# Patient Record
Sex: MALE | Race: WHITE | NOT HISPANIC OR LATINO | URBAN - METROPOLITAN AREA
[De-identification: names, ages, dates, MRNs, and addresses within clinical notes are randomized per-mention and may not be internally consistent; named-entity substitution may affect disease eponyms.]

---

## 2017-09-09 ENCOUNTER — EMERGENCY (EMERGENCY)
Facility: HOSPITAL | Age: 32
LOS: 1 days | Discharge: HOME | End: 2017-09-09
Admitting: EMERGENCY MEDICINE
Payer: MEDICAID

## 2017-09-09 VITALS
TEMPERATURE: 98 F | OXYGEN SATURATION: 100 % | RESPIRATION RATE: 17 BRPM | DIASTOLIC BLOOD PRESSURE: 96 MMHG | HEART RATE: 78 BPM | SYSTOLIC BLOOD PRESSURE: 150 MMHG

## 2017-09-09 DIAGNOSIS — S01.511A LACERATION WITHOUT FOREIGN BODY OF LIP, INITIAL ENCOUNTER: ICD-10-CM

## 2017-09-09 DIAGNOSIS — E78.5 HYPERLIPIDEMIA, UNSPECIFIED: ICD-10-CM

## 2017-09-09 DIAGNOSIS — S00.511A ABRASION OF LIP, INITIAL ENCOUNTER: ICD-10-CM

## 2017-09-09 PROCEDURE — 12011 RPR F/E/E/N/L/M 2.5 CM/<: CPT

## 2017-09-09 PROCEDURE — 99283 EMERGENCY DEPT VISIT LOW MDM: CPT | Mod: 25

## 2017-09-09 NOTE — ED PROVIDER NOTE - MEDICAL DECISION MAKING DETAILS
pt with persistent bleeding from philtrum region, unknown etiology, not on any AC, no associated pain or change in behavior, likely from friction/rubbing, bleeding controlled with local injection of lido w epi and dermabond w steristrips, wound care instructions provided, medically stable for d/c

## 2017-09-09 NOTE — ED PROVIDER NOTE - OBJECTIVE STATEMENT
31 yo M with PMHx of moderate MR, seizure d/o, autism, HLD, lyme dz, sent from day program for evaluation of bleeding from philtrum region x 1 hr.  Per counselor, pt was at the day program and noted spontaneous bleeding from the philtrum region, unable to stop with direct pressure and sent in for further evaluation 31 yo M with PMHx of moderate MR, seizure d/o, autism, HLD, lyme dz, sent from day program for evaluation of bleeding from philtrum region x 1 hr.  Per counselor, pt was at the day program and noted spontaneous bleeding from the philtrum region, unable to stop with direct pressure and sent in for further evaluation.  No apparent trauma, fall, pain, SOB, CP, epistaxis, HA, dizziness, change in behavior, N/V, or focal weakness noted.  Last tetanus within 5 yrs.  Per counselor, pt does rub his nose often and cause skin avulsion from time to time. Not on any AC/NSAID

## 2017-09-09 NOTE — ED PROVIDER NOTE - PHYSICAL EXAMINATION
Gen - NAD, comfortable in stretcher,  non-toxic appearing  Skin - warm, dry, mild skin avulsion to the philtrum region noted with active bleeding, no FB, bony or cartilage exposure noted   CV - S1S2, R/R/R  Resp - CTAB, no r/r/w   MS - w/w/p, no c/c/e  Neuro - AxOx3, interacting appropriately

## 2018-02-08 ENCOUNTER — EMERGENCY (EMERGENCY)
Facility: HOSPITAL | Age: 33
LOS: 1 days | Discharge: HOME | End: 2018-02-08
Admitting: EMERGENCY MEDICINE
Payer: MEDICAID

## 2018-02-08 VITALS
TEMPERATURE: 99 F | SYSTOLIC BLOOD PRESSURE: 121 MMHG | RESPIRATION RATE: 18 BRPM | OXYGEN SATURATION: 99 % | DIASTOLIC BLOOD PRESSURE: 70 MMHG | HEART RATE: 101 BPM

## 2018-02-08 VITALS
RESPIRATION RATE: 18 BRPM | HEART RATE: 101 BPM | OXYGEN SATURATION: 100 % | TEMPERATURE: 98 F | DIASTOLIC BLOOD PRESSURE: 69 MMHG | SYSTOLIC BLOOD PRESSURE: 107 MMHG

## 2018-02-08 DIAGNOSIS — R05 COUGH: ICD-10-CM

## 2018-02-08 DIAGNOSIS — J11.00 INFLUENZA DUE TO UNIDENTIFIED INFLUENZA VIRUS WITH UNSPECIFIED TYPE OF PNEUMONIA: ICD-10-CM

## 2018-02-08 LAB
FLUAV SPEC QL CULT: NEGATIVE — SIGNIFICANT CHANGE UP
FLUBV AG SPEC QL IA: POSITIVE

## 2018-02-08 PROCEDURE — 99284 EMERGENCY DEPT VISIT MOD MDM: CPT

## 2018-02-08 PROCEDURE — 71046 X-RAY EXAM CHEST 2 VIEWS: CPT | Mod: 26

## 2018-02-08 RX ORDER — IBUPROFEN 200 MG
1 TABLET ORAL
Qty: 28 | Refills: 0 | OUTPATIENT
Start: 2018-02-08 | End: 2018-02-14

## 2018-02-08 RX ORDER — AZITHROMYCIN 500 MG/1
500 TABLET, FILM COATED ORAL ONCE
Qty: 0 | Refills: 0 | Status: COMPLETED | OUTPATIENT
Start: 2018-02-08 | End: 2018-02-08

## 2018-02-08 RX ORDER — CLARITHROMYCIN 500 MG
1 TABLET ORAL
Qty: 4 | Refills: 0 | OUTPATIENT
Start: 2018-02-08 | End: 2018-02-11

## 2018-02-08 RX ADMIN — Medication 75 MILLIGRAM(S): at 13:27

## 2018-02-08 RX ADMIN — AZITHROMYCIN 500 MILLIGRAM(S): 500 TABLET, FILM COATED ORAL at 13:27

## 2018-02-08 NOTE — ED PROVIDER NOTE - MEDICAL DECISION MAKING DETAILS
well appearing male from [facility], hx MR/autism, here with cough/cold symptoms with fever 99.6 at [facility], rapid flu +, will treat with tamiflu, cxr shows early RML infiltrate - will treat for CAP with z-laurie, supportive care, f/u PMD

## 2018-02-08 NOTE — ED ADULT NURSE NOTE - OBJECTIVE STATEMENT
Pt brought in by aide from residence. As per aide pt has been coughing for past 2 days, wanted to have him tested for flu. Aide denies any fevers or chills. Pt has hx of MR and autism, able to answer yes or no questions. Pt denies chest pain or SOB. Pt appears in no apparent distress, will continue to monitor

## 2018-02-08 NOTE — ED PROVIDER NOTE - OBJECTIVE STATEMENT
32 y/o M with h/o severe MR, h/o autism, h/o seizure disorder, is here with a home aid, the home aid states that he's been coughing for the past 2 days and reports a temp of 99.6 at group home. He's tolerating PO at the group home. Pt denies abd pain, vomiting, or diarrhea. 32 y/o M with h/o severe MR, h/o autism, h/o seizure disorder, is here with staff, the aid states that he's been coughing for the past 2 days and reports a temp of 99.6 at group home. Eating/drinking well at the group home. Pt denies abd pain, vomiting, or diarrhea.

## 2019-06-06 ENCOUNTER — EMERGENCY (EMERGENCY)
Facility: HOSPITAL | Age: 34
LOS: 1 days | Discharge: HOME | End: 2019-06-06
Attending: EMERGENCY MEDICINE | Admitting: EMERGENCY MEDICINE
Payer: MEDICAID

## 2019-06-06 VITALS
WEIGHT: 169.98 LBS | OXYGEN SATURATION: 96 % | RESPIRATION RATE: 18 BRPM | SYSTOLIC BLOOD PRESSURE: 114 MMHG | HEART RATE: 75 BPM | TEMPERATURE: 99 F | DIASTOLIC BLOOD PRESSURE: 80 MMHG

## 2019-06-06 PROCEDURE — 99283 EMERGENCY DEPT VISIT LOW MDM: CPT

## 2019-06-06 RX ORDER — CETIRIZINE HYDROCHLORIDE 10 MG/1
1 TABLET ORAL
Qty: 10 | Refills: 0
Start: 2019-06-06 | End: 2019-06-15

## 2019-06-06 RX ORDER — SODIUM CHLORIDE 0.65 %
1 AEROSOL, SPRAY (ML) NASAL
Qty: 1 | Refills: 0
Start: 2019-06-06 | End: 2019-07-05

## 2019-06-06 NOTE — ED PROVIDER NOTE - ENMT, MLM
Airway patent, Nasal mucosa clear. Mouth with normal mucosa. Throat has no vesicles, no oropharyngeal exudates and uvula is midline. no active bleeding

## 2019-06-06 NOTE — ED PROVIDER NOTE - OBJECTIVE STATEMENT
34 y.o. male with  hx autism lyme HTN HLD seizures severe dev delay from group home with aide who states he has cough sneezing and rhinorrhea, pt is a poor historian and the aide in minimally contributive to hx/HPI.  Denies fever/chills, non productive cough, no vomiting, normal appetite, normal behavior.

## 2019-06-06 NOTE — ED PROVIDER NOTE - CLINICAL SUMMARY MEDICAL DECISION MAKING FREE TEXT BOX
34 y.o. male with  hx autism lyme HTN HLD seizures severe dev delay with cough nasal congestion rhinorrhea from viral URI, also with hx seasonal allergies could be exacerbation of that.  Lungs clear no wheezing no dyspnea no SOB.  supportive care, cough med, allergy med, nasal saline, stable for dc home outpatient followup.

## 2019-06-06 NOTE — ED ADULT TRIAGE NOTE - CHIEF COMPLAINT QUOTE
presents to ED with caregiver with cough, some dried blood around nose, nasal congestion since today.

## 2019-06-10 DIAGNOSIS — R05 COUGH: ICD-10-CM

## 2019-06-10 DIAGNOSIS — J06.9 ACUTE UPPER RESPIRATORY INFECTION, UNSPECIFIED: ICD-10-CM

## 2019-06-10 DIAGNOSIS — Z79.899 OTHER LONG TERM (CURRENT) DRUG THERAPY: ICD-10-CM

## 2019-06-10 DIAGNOSIS — I10 ESSENTIAL (PRIMARY) HYPERTENSION: ICD-10-CM

## 2019-06-10 DIAGNOSIS — J30.2 OTHER SEASONAL ALLERGIC RHINITIS: ICD-10-CM

## 2019-06-10 DIAGNOSIS — E78.5 HYPERLIPIDEMIA, UNSPECIFIED: ICD-10-CM

## 2019-06-12 RX ORDER — GEMFIBROZIL 600 MG
1 TABLET ORAL
Qty: 0 | Refills: 0 | DISCHARGE

## 2019-06-12 RX ORDER — ASPIRIN/CALCIUM CARB/MAGNESIUM 324 MG
1 TABLET ORAL
Qty: 0 | Refills: 0 | DISCHARGE

## 2019-06-12 RX ORDER — LISINOPRIL 2.5 MG/1
1 TABLET ORAL
Qty: 0 | Refills: 0 | DISCHARGE

## 2019-06-12 RX ORDER — LACOSAMIDE 50 MG/1
1 TABLET ORAL
Qty: 0 | Refills: 0 | DISCHARGE

## 2019-06-12 RX ORDER — CITALOPRAM 10 MG/1
1 TABLET, FILM COATED ORAL
Qty: 0 | Refills: 0 | DISCHARGE

## 2019-06-12 RX ORDER — FLUTICASONE PROPIONATE 220 MCG
1 AEROSOL WITH ADAPTER (GRAM) INHALATION
Qty: 0 | Refills: 0 | DISCHARGE

## 2019-06-13 PROBLEM — F84.0 AUTISTIC DISORDER: Chronic | Status: ACTIVE | Noted: 2018-02-08

## 2019-06-13 PROBLEM — F72 SEVERE INTELLECTUAL DISABILITIES: Chronic | Status: ACTIVE | Noted: 2018-02-08

## 2019-06-13 PROBLEM — G40.909 EPILEPSY, UNSPECIFIED, NOT INTRACTABLE, WITHOUT STATUS EPILEPTICUS: Chronic | Status: ACTIVE | Noted: 2018-02-08

## 2020-01-25 ENCOUNTER — EMERGENCY (EMERGENCY)
Facility: HOSPITAL | Age: 35
LOS: 1 days | Discharge: HOME | End: 2020-01-25
Admitting: EMERGENCY MEDICINE
Payer: MEDICAID

## 2020-01-25 VITALS
TEMPERATURE: 98 F | HEIGHT: 67 IN | DIASTOLIC BLOOD PRESSURE: 76 MMHG | RESPIRATION RATE: 17 BRPM | HEART RATE: 68 BPM | SYSTOLIC BLOOD PRESSURE: 111 MMHG | WEIGHT: 149.91 LBS | OXYGEN SATURATION: 98 %

## 2020-01-25 PROCEDURE — 99283 EMERGENCY DEPT VISIT LOW MDM: CPT

## 2020-01-25 RX ADMIN — Medication 0.5 MILLIGRAM(S): at 22:29

## 2020-01-25 NOTE — ED ADULT NURSE NOTE - CHIEF COMPLAINT QUOTE
Pt presents from [facility] for medication refill. As per pt paperwork pt has run out of Ativan prescription, h/o seizures,Autism, high cholesterol, lyme disease and moderate MR. Pt calm and cooperative in triage.

## 2020-01-25 NOTE — ED PROVIDER NOTE - PATIENT PORTAL LINK FT
You can access the FollowMyHealth Patient Portal offered by [facility] by registering at the following website: [email or web address]. By joining [employer]’s FollowMyHealth portal, you will also be able to view your health information using other applications (apps) compatible with our system.

## 2020-01-25 NOTE — ED ADULT TRIAGE NOTE - CHIEF COMPLAINT QUOTE
Pt presents from [facility] for medication refill. As per pt paperwork pt has run out of Ativan prescription, h/o seizures,Autism, high cholesterol, lyme disease and moderate MR. Pt calm and cooperative in triage.

## 2020-01-25 NOTE — ED ADULT NURSE NOTE - PMH
Autism    Autism    HLD (hyperlipidemia)    Lyme disease    Mental retardation    MR (mental retardation), severe    Seizure disorder    Seizure disorder

## 2020-01-25 NOTE — ED PROVIDER NOTE - NSFOLLOWUPINSTRUCTIONS_ED_ALL_ED_FT
Please follow up with your doctor for refill prescriptions of your medications  You were given a dose of ativan 0.5mg in the ED tonight    RETURN TO THE EMERGENCY DEPARTMENT FOR ANY CONCERNS.

## 2020-01-25 NOTE — ED PROVIDER NOTE - PHYSICAL EXAMINATION
CONSTITUTIONAL: Well-developed; well-nourished; in no acute distress. tolerating PO  	SKIN: Skin is warm and dry, no acute rash.  	HEAD: Normocephalic; atraumatic.  	EYES: clear bilaterally   	ENT:  airway patent  	NECK: Supple; non tender.  	CARD: S1, S2 normal; no murmurs, gallops, or rubs. Regular rate and rhythm.  	RESP: No wheezes, rales or rhonchi.  	ABD: soft; non-distended; non-tender  	EXT: Normal ROM. ambulatory   	NEURO: at baseline  PSYCH: Cooperative, appropriate.

## 2020-01-25 NOTE — ED PROVIDER NOTE - CLINICAL SUMMARY MEDICAL DECISION MAKING FREE TEXT BOX
patient here with staff for dose of ativan as he is awaiting for medication delivery at facility, well appearing, no complaints. tolerating PO. will give dose here, f/u PMD

## 2020-01-25 NOTE — ED PROVIDER NOTE - OBJECTIVE STATEMENT
35 yo male here from [facility] with staff for dose of ativan as they are awaiting delivery of his medications from pharmacy, patient is due for dose tonight. h/o seizures ,Autism, high cholesterol, lyme disease and moderate MR. patient offers no medical complaints.

## 2020-01-26 ENCOUNTER — EMERGENCY (EMERGENCY)
Facility: HOSPITAL | Age: 35
LOS: 1 days | Discharge: HOME | End: 2020-01-26
Admitting: EMERGENCY MEDICINE
Payer: MEDICAID

## 2020-01-26 VITALS
RESPIRATION RATE: 16 BRPM | HEART RATE: 76 BPM | HEIGHT: 67 IN | OXYGEN SATURATION: 99 % | SYSTOLIC BLOOD PRESSURE: 117 MMHG | DIASTOLIC BLOOD PRESSURE: 76 MMHG | TEMPERATURE: 98 F

## 2020-01-26 PROBLEM — E78.5 HYPERLIPIDEMIA, UNSPECIFIED: Chronic | Status: ACTIVE | Noted: 2017-09-09

## 2020-01-26 PROBLEM — G40.909 EPILEPSY, UNSPECIFIED, NOT INTRACTABLE, WITHOUT STATUS EPILEPTICUS: Chronic | Status: ACTIVE | Noted: 2017-09-09

## 2020-01-26 PROBLEM — A69.20 LYME DISEASE, UNSPECIFIED: Chronic | Status: ACTIVE | Noted: 2017-09-09

## 2020-01-26 PROBLEM — F79 UNSPECIFIED INTELLECTUAL DISABILITIES: Chronic | Status: ACTIVE | Noted: 2017-09-09

## 2020-01-26 PROBLEM — F84.0 AUTISTIC DISORDER: Chronic | Status: ACTIVE | Noted: 2017-09-09

## 2020-01-26 PROCEDURE — 99283 EMERGENCY DEPT VISIT LOW MDM: CPT

## 2020-01-26 NOTE — ED PROVIDER NOTE - OBJECTIVE STATEMENT
33 y/o male with PMHx of autism, HLD, Lyme Disease, mental retardation, and seizure disorder, from [facility], accompanied by staff workers, presents to the ED requesting Ativan 0.5mg dose. As per staff workers, Pt ran out medications and is awaiting refills - unsure when the medication will be delivered to him. No other medical complaints at this time. Denies fever or chills.

## 2020-01-26 NOTE — ED PROVIDER NOTE - NSFOLLOWUPINSTRUCTIONS_ED_ALL_ED_FT
WE ARE CURRENTLY OUT OF ATIVAN 0.5MG PILLS. PLEASE BRING THE PATIENT TO ANOTHER FACILITY IN ORDER TO HAVE HIS DOSE OF MEDICATION ADMINISTERED.    PLEASE RETURN TO THE ER IMMEDIATELY IF THE PATIENT DEVELOPS ANY ACUTE MEDICAL COMPLAINTS OR ISSUES, OR IF YOU HAVE ANY ADDITIONAL CONCERNS.

## 2020-01-26 NOTE — ED PROVIDER NOTE - PATIENT PORTAL LINK FT
You can access the FollowMyHealth Patient Portal offered by [facility] by registering at the following website: [email or web address]. By joining [employer]’s FollowMyHealth portal, you will also be able to view your health information using other applications (apps) compatible with our system.

## 2020-01-26 NOTE — ED PROVIDER NOTE - CLINICAL SUMMARY MEDICAL DECISION MAKING FREE TEXT BOX
Ativan 0.5mg PO is out of stock. The patient is sitting comfortably in no acute distress with staff by his side, and offers no medical complaints at this time. This was discussed with [facility]'s nurse Xochitl Martinez ([phone]) who will have the patient brought to another site for his dose of Ativan 05mg PO. Strict return precautions reviewed.

## 2020-01-26 NOTE — ED ADULT NURSE NOTE - CHPI ED NUR SYMPTOMS NEG
no dizziness/no chills/no fever/no nausea/no pain/no weakness/no decreased eating/drinking/no tingling/no vomiting

## 2020-01-26 NOTE — ED ADULT TRIAGE NOTE - CHIEF COMPLAINT QUOTE
Pt from [facility], presents for dose of Ativan 0.5 Pt from [facility], presents for dose of Ativan 0.5mg, pt ran out medication and is awaiting refills

## 2020-01-30 DIAGNOSIS — Z79.82 LONG TERM (CURRENT) USE OF ASPIRIN: ICD-10-CM

## 2020-01-30 DIAGNOSIS — E78.5 HYPERLIPIDEMIA, UNSPECIFIED: ICD-10-CM

## 2020-01-30 DIAGNOSIS — F84.0 AUTISTIC DISORDER: ICD-10-CM

## 2020-01-30 DIAGNOSIS — Z79.899 OTHER LONG TERM (CURRENT) DRUG THERAPY: ICD-10-CM

## 2020-01-30 DIAGNOSIS — Z79.1 LONG TERM (CURRENT) USE OF NON-STEROIDAL ANTI-INFLAMMATORIES (NSAID): ICD-10-CM

## 2020-01-31 DIAGNOSIS — E78.5 HYPERLIPIDEMIA, UNSPECIFIED: ICD-10-CM

## 2020-01-31 DIAGNOSIS — Z79.899 OTHER LONG TERM (CURRENT) DRUG THERAPY: ICD-10-CM

## 2020-01-31 DIAGNOSIS — Z76.0 ENCOUNTER FOR ISSUE OF REPEAT PRESCRIPTION: ICD-10-CM

## 2020-01-31 DIAGNOSIS — Z79.82 LONG TERM (CURRENT) USE OF ASPIRIN: ICD-10-CM

## 2020-01-31 DIAGNOSIS — Z79.1 LONG TERM (CURRENT) USE OF NON-STEROIDAL ANTI-INFLAMMATORIES (NSAID): ICD-10-CM

## 2020-01-31 DIAGNOSIS — F84.0 AUTISTIC DISORDER: ICD-10-CM

## 2020-07-28 NOTE — ED PROVIDER NOTE - EYES, MLM
----- Message from Silvia Agarwal sent at 7/28/2020  3:24 PM CDT -----  Regarding: clarfication  Contact: [facility], [place]  Placed call to pod, Type:  Pharmacy Calling to Clarify an RX    Name of Caller:  [place]  Pharmacy Name:    [id number] [place] [address] AT [facility] & [address] [address]  Phone: [phone] Fax: [phone]     Prescription Name:  Novalog  What do they need to clarify?:  directions and roberto authorization   Best Call Back Number:  [phone]    Case number [id number]          
Being addressed in previous encounter  
Clear bilaterally, pupils equal, round and reactive to light.

## 2021-05-04 NOTE — ED PROVIDER NOTE - NS ED MD EM SELECTION
PT: approached pt this morning, pt refusing therapy due to bowel movement issues. Will continue to follow.    [id number] Detailed

## 2021-08-09 ENCOUNTER — EMERGENCY (EMERGENCY)
Facility: HOSPITAL | Age: 36
LOS: 1 days | Discharge: HOME | End: 2021-08-09
Attending: EMERGENCY MEDICINE | Admitting: EMERGENCY MEDICINE
Payer: MEDICAID

## 2021-08-09 VITALS
SYSTOLIC BLOOD PRESSURE: 108 MMHG | OXYGEN SATURATION: 97 % | TEMPERATURE: 98 F | HEART RATE: 77 BPM | RESPIRATION RATE: 16 BRPM | DIASTOLIC BLOOD PRESSURE: 72 MMHG

## 2021-08-09 VITALS
HEIGHT: 67 IN | OXYGEN SATURATION: 98 % | DIASTOLIC BLOOD PRESSURE: 70 MMHG | HEART RATE: 67 BPM | TEMPERATURE: 98 F | WEIGHT: 160.06 LBS | SYSTOLIC BLOOD PRESSURE: 106 MMHG | RESPIRATION RATE: 18 BRPM

## 2021-08-09 DIAGNOSIS — E78.5 HYPERLIPIDEMIA, UNSPECIFIED: ICD-10-CM

## 2021-08-09 DIAGNOSIS — Z76.0 ENCOUNTER FOR ISSUE OF REPEAT PRESCRIPTION: ICD-10-CM

## 2021-08-09 DIAGNOSIS — F84.0 AUTISTIC DISORDER: ICD-10-CM

## 2021-08-09 DIAGNOSIS — G40.909 EPILEPSY, UNSPECIFIED, NOT INTRACTABLE, WITHOUT STATUS EPILEPTICUS: ICD-10-CM

## 2021-08-09 DIAGNOSIS — F79 UNSPECIFIED INTELLECTUAL DISABILITIES: ICD-10-CM

## 2021-08-09 DIAGNOSIS — F72 SEVERE INTELLECTUAL DISABILITIES: ICD-10-CM

## 2021-08-09 PROCEDURE — 99283 EMERGENCY DEPT VISIT LOW MDM: CPT

## 2021-08-09 RX ADMIN — Medication 0.5 MILLIGRAM(S): at 01:46

## 2021-08-09 NOTE — ED PROVIDER NOTE - PATIENT PORTAL LINK FT
You can access the FollowMyHealth Patient Portal offered by [facility] by registering at the following website: [email or web address]. By joining [employer]’s FollowMyHealth portal, you will also be able to view your health information using other applications (apps) compatible with our system.

## 2021-08-09 NOTE — ED PROVIDER NOTE - CLINICAL SUMMARY MEDICAL DECISION MAKING FREE TEXT BOX
given evening dose po lorazepam, will dc with one week worth, and group home will obtain full refill from pmd. clinically stable for dc.

## 2021-08-09 NOTE — ED ADULT TRIAGE NOTE - CHIEF COMPLAINT QUOTE
Pt presents to ED with caretaker from [facility] home for med refill. As per care taker, 2mg Lorazepam ran out today and refill was not delivered.

## 2021-08-09 NOTE — ED PROVIDER NOTE - OBJECTIVE STATEMENT
37 yo M, hx of autism, severe MR, minimally verbal, seizure disorder, resident of group [place], with standing order for lorazepam 0.5 mg bid. per group home the pmd did not renew the script and patient ran out not getting his evening dose. otherwise no acute medical complaints. history limited secondary to mr, history provided by group home attendant.

## 2022-08-09 ENCOUNTER — EMERGENCY (EMERGENCY)
Facility: HOSPITAL | Age: 37
LOS: 1 days | Discharge: HOME | End: 2022-08-09
Attending: EMERGENCY MEDICINE
Payer: MEDICAID

## 2022-08-09 VITALS
RESPIRATION RATE: 18 BRPM | WEIGHT: 158.73 LBS | TEMPERATURE: 97 F | HEIGHT: 60 IN | HEART RATE: 78 BPM | DIASTOLIC BLOOD PRESSURE: 72 MMHG | SYSTOLIC BLOOD PRESSURE: 111 MMHG | OXYGEN SATURATION: 96 %

## 2022-08-09 PROCEDURE — 99283 EMERGENCY DEPT VISIT LOW MDM: CPT

## 2022-08-09 PROCEDURE — 99281 EMR DPT VST MAYX REQ PHY/QHP: CPT

## 2022-08-09 RX ADMIN — Medication 0.5 MILLIGRAM(S): at 12:49

## 2022-08-09 NOTE — ED PROVIDER NOTE - PATIENT PORTAL LINK FT
You can access the FollowMyHealth Patient Portal offered by [facility] by registering at the following website: [email or web address]. By joining [employer]’s FollowMyHealth portal, you will also be able to view your health information using other applications (apps) compatible with our system.

## 2022-08-09 NOTE — ED PROVIDER NOTE - PHYSICAL EXAMINATION
Afebrile, hemodynamically stable, saturating well  NAD, well appearing, sitting comfortably in chair, no WOB, speaking full sentences  Head NCAT  EOMI grossly, anicteric  MMM  RRR, nml S1/S2, no m/r/g  Lungs CTAB, no w/r/r  Abd soft, NT, ND, nml BS, no rebound or guarding  Alert, CN's 3-12 grossly intact  MENDOZA spontaneously, no leg cyanosis or edema  Skin warm, well perfused, no rashes or hives

## 2022-08-09 NOTE — ED PROVIDER NOTE - OBJECTIVE STATEMENT
37yoM with h/o seizure disorder, autism, HLD, moderate intellectual disability, presents from [facility] with aide for rx refill for lorazepam .5mg BID which he last took last night, as they state they called his clinic but will not be able to get an rx over for the next few days. They state he is behaving at his usual baseline and is calm and controlled, nml PO, UOP, behavior level, and no other concerns.

## 2022-08-09 NOTE — ED PROVIDER NOTE - CLINICAL SUMMARY MEDICAL DECISION MAKING FREE TEXT BOX
I attempted to reach pt's doctor but number listed goes through to voicemail. I d/w caretaker here and supervisor at [facility], and understand unable to prescribe this at this time and needs to see his own provider, but given his morning dose. NAD, well appearing, calm here in ED.

## 2022-08-09 NOTE — ED PROVIDER NOTE - NSFOLLOWUPINSTRUCTIONS_ED_ALL_ED_FT
Bj was given 0.5mg lorazepam.  Please follow up with your doctor.    Medicine Refill at the Emergency Department    It is best for you to get refills through your primary health care provider's office. In the future, please plan ahead so you do not need to get refills from the emergency department.    This information is not intended to replace advice given to you by your health care provider. Make sure you discuss any questions you have with your health care provider.

## 2022-08-26 ENCOUNTER — EMERGENCY (EMERGENCY)
Facility: HOSPITAL | Age: 37
LOS: 1 days | Discharge: HOME | End: 2022-08-26
Attending: EMERGENCY MEDICINE
Payer: MEDICAID

## 2022-08-26 VITALS
DIASTOLIC BLOOD PRESSURE: 77 MMHG | OXYGEN SATURATION: 96 % | RESPIRATION RATE: 16 BRPM | HEART RATE: 68 BPM | SYSTOLIC BLOOD PRESSURE: 109 MMHG | HEIGHT: 60 IN | TEMPERATURE: 98 F | WEIGHT: 155.65 LBS

## 2022-08-26 PROCEDURE — 99281 EMR DPT VST MAYX REQ PHY/QHP: CPT

## 2022-08-26 PROCEDURE — 99283 EMERGENCY DEPT VISIT LOW MDM: CPT

## 2022-08-26 NOTE — ED PROVIDER NOTE - NSFOLLOWUPINSTRUCTIONS_ED_ALL_ED_FT
Log Out.      [email or web address].fr CareNotes®     :  [facility]  	                     ANXIETY - AfterCare(R) Instructions(ER/ED)           Anxiety    WHAT YOU NEED TO KNOW:    Anxiety is a condition that causes you to feel extremely worried or nervous. The feelings are so strong that they can cause problems with your daily activities or sleep. Anxiety may be triggered by something you fear, or it may happen without a cause. Family or work stress, smoking, caffeine, and alcohol can increase your risk for anxiety. Certain medicines or health conditions can also increase your risk. Anxiety can become a long-term condition if it is not managed or treated.    DISCHARGE INSTRUCTIONS:    Call your local emergency number (911 in the [place]) if:   •You have chest pain, tightness, or heaviness that may spread to your shoulders, arms, jaw, neck, or back.      •You feel like hurting yourself or someone else.      Call your doctor if:   •Your symptoms get worse or do not get better with treatment.      •Your anxiety keeps you from doing your regular daily activities.      •You have new symptoms since your last visit.      •You have questions or concerns about your condition or care.      Medicines:   •Medicines may be given to help you feel more calm and relaxed, and decrease your symptoms.      •Take your medicine as directed. Contact your healthcare provider if you think your medicine is not helping or if you have side effects. Tell him or her if you are allergic to any medicine. Keep a list of the medicines, vitamins, and herbs you take. Include the amounts, and when and why you take them. Bring the list or the pill bottles to follow-up visits. Carry your medicine list with you in case of an emergency.      Manage anxiety:   •Talk to someone about your anxiety. Your healthcare provider may suggest counseling. Cognitive behavioral therapy can help you understand and change how you react to events that trigger your symptoms. You might feel more comfortable talking with a friend or family member about your anxiety. Choose someone you know will be supportive and encouraging.      •Find ways to relax. Activities such as exercise, meditation, or listening to music can help you relax. Spend time with friends, or do things you enjoy.      •Practice deep breathing. Deep breathing can help you relax when you feel anxious. Focus on taking slow, deep breaths several times a day, or during an anxiety attack. Breathe in through your nose and out through your mouth.      •Create a regular sleep routine. Regular sleep can help you feel calmer during the day. Go to sleep and wake up at the same times every day. Do not watch television or use the computer right before bed. Your room should be comfortable, dark, and quiet.      •Eat a variety of healthy foods. Healthy foods include fruits, vegetables, low-fat dairy products, lean meats, fish, whole-grain breads, and cooked beans. Healthy foods can help you feel less anxious and have more energy.  Healthy Foods           •Exercise regularly. Exercise can increase your energy level. Exercise may also lift your mood and help you sleep better. Your healthcare provider can help you create an exercise plan.   Family Walking for Exercise           •Do not smoke. Nicotine and other chemicals in cigarettes and cigars can increase anxiety. Ask your healthcare provider for information if you currently smoke and need help to quit. E-cigarettes or smokeless tobacco still contain nicotine. Talk to your healthcare provider before you use these products.      •Do not have caffeine. Caffeine can make your symptoms worse. Do not have foods or drinks that are meant to increase your energy level.      •Limit or do not drink alcohol. Ask your healthcare provider if alcohol is safe for you. You may not be able to drink alcohol if you take certain anxiety or depression medicines. Limit alcohol to 1 drink per day if you are a woman. Limit alcohol to 2 drinks per day if you are a man. A drink of alcohol is 12 ounces of beer, 5 ounces of wine, or 1½ ounces of liquor.      •Do not use drugs. Drugs can make your anxiety worse. It can also make anxiety hard to manage. Talk to your healthcare provider if you use drugs and want help to quit.      Follow up with your doctor within 2 weeks or as directed: Write down your questions so you remember to ask them during your visits.       © Copyright [employer] 2022           back to top                          © Copyright [employer] 2022

## 2022-08-26 NOTE — ED PROVIDER NOTE - PATIENT PORTAL LINK FT
You can access the FollowMyHealth Patient Portal offered by [facility] by registering at the following website: [email or web address]. By joining [employer]’s FollowMyHealth portal, you will also be able to view your health information using other applications (apps) compatible with our system.

## 2022-08-26 NOTE — ED PROVIDER NOTE - OBJECTIVE STATEMENT
here for medication refill for ativan hx of anxiety unable to see his pcp for the next few days  lives at adult home.

## 2022-08-26 NOTE — ED ADULT NURSE NOTE - OBJECTIVE STATEMENT
patient brought in from [facility]isted [facility] by staff requesting medication for ativan 0.5mg for hx of anxiety. Pt unable to provide much of info

## 2022-08-26 NOTE — ED PROVIDER NOTE - DURATION
I have personally performed a face to face diagnostic evaluation on this patient. I have reviewed the ACP note and agree with the history, exam and plan of care, except as noted.
month(s)

## 2022-12-01 ENCOUNTER — EMERGENCY (EMERGENCY)
Facility: HOSPITAL | Age: 37
LOS: 1 days | Discharge: HOME | End: 2022-12-01
Payer: MEDICAID

## 2022-12-01 VITALS
TEMPERATURE: 98 F | DIASTOLIC BLOOD PRESSURE: 82 MMHG | HEART RATE: 63 BPM | RESPIRATION RATE: 18 BRPM | WEIGHT: 169.98 LBS | SYSTOLIC BLOOD PRESSURE: 124 MMHG | OXYGEN SATURATION: 98 %

## 2022-12-01 PROCEDURE — 99281 EMR DPT VST MAYX REQ PHY/QHP: CPT

## 2022-12-01 PROCEDURE — 99283 EMERGENCY DEPT VISIT LOW MDM: CPT

## 2022-12-01 RX ORDER — LACOSAMIDE 50 MG/1
1 TABLET ORAL
Qty: 6 | Refills: 0
Start: 2022-12-01 | End: 2022-12-03

## 2022-12-01 NOTE — ED PROVIDER NOTE - PATIENT PORTAL LINK FT
You can access the FollowMyHealth Patient Portal offered by [facility] by registering at the following website: [email or web address]. By joining [employer]’s FollowMyHealth portal, you will also be able to view your health information using other applications (apps) compatible with our system.

## 2022-12-01 NOTE — ED PROVIDER NOTE - PHYSICAL EXAMINATION
GEN:   comfortable, in no apparent distress, AOx3  EYES:   PERRL, extra-occular movements intact  RESP:   clear to auscultation bilaterally, non-labored, speaking in full sentences  ABD:   soft, non tender, no guarding  MSK:   no musculoskeletal tenderness, 5/5 strength, moving all extremities  SKIN:   dry, intact, no rash  NEURO:   AOx3, no focal weakness or loss of sensation, gait normal, GCS 15  PSYCH: calm, cooperative, no evidence of hallucinations, paranoia, intoxication or withdrawal from any substances.

## 2022-12-01 NOTE — ED ADULT TRIAGE NOTE - CHIEF COMPLAINT QUOTE
pt brought in from group home as per aid " my patient is here for medication refill ' pt denies any seizure

## 2022-12-01 NOTE — ED PROVIDER NOTE - OBJECTIVE STATEMENT
37yoM with h/o seizure disorder, autism, HLD, moderate intellectual disability, presents from [facility] with grant [facility]n for rx refill for Vimpat. States patient takes it BID 100mg.  requesting 3 day refill as unable to get from outpatient provider.  States that patient has not skipped any doses.

## 2023-05-07 NOTE — ED PROVIDER NOTE - CPE EDP PSYCH NORM
normal... normal... awake, alert, oriented to person, place, time/situation and in no apparent distress.

## 2023-12-05 ENCOUNTER — EMERGENCY (EMERGENCY)
Facility: HOSPITAL | Age: 38
LOS: 1 days | Discharge: HOME | End: 2023-12-05
Admitting: EMERGENCY MEDICINE
Payer: MEDICAID

## 2023-12-05 VITALS
RESPIRATION RATE: 16 BRPM | TEMPERATURE: 98 F | OXYGEN SATURATION: 98 % | HEART RATE: 69 BPM | WEIGHT: 175.05 LBS | SYSTOLIC BLOOD PRESSURE: 122 MMHG | DIASTOLIC BLOOD PRESSURE: 80 MMHG | HEIGHT: 68 IN

## 2023-12-05 PROCEDURE — 99283 EMERGENCY DEPT VISIT LOW MDM: CPT

## 2023-12-05 NOTE — ED PROVIDER NOTE - PHYSICAL EXAMINATION
General: well developed, well nourished, no distress  Eyes: no scleral injection  Neck: non-tender, full range of motion, supple.   Respiratory: unlabored breathing  Cardiovascular: no central cyanosis  Musculoskeletal: normal gait.   Extremities: normal range of motion, non-tender.  Neurologic: alert, oriented to person, oriented to place, oriented to time.    Skin: normal color.  Negative For: rash  Psychiatric: normal affect, normal insight, normal concentration

## 2023-12-05 NOTE — ED PROVIDER NOTE - NSICDXPASTMEDICALHX_GEN_ALL_CORE_FT
PAST MEDICAL HISTORY:  Autism     Autism     HLD (hyperlipidemia)     Lyme disease     Mental retardation     MR (mental retardation), severe     Seizure disorder     Seizure disorder

## 2023-12-05 NOTE — ED PROVIDER NOTE - PATIENT PORTAL LINK FT
You can access the FollowMyHealth Patient Portal offered by [facility] by registering at the following website: [email or web address]. By joining [employer]’s FollowMyHealth portal, you will also be able to view your health information using other applications (apps) compatible with our system. You can access the FollowMyHealth Patient Portal offered by [facility] by registering at the following website: [email or web address]. By joining [employer]’s FollowMyHealth portal, you will also be able to view your health information using other applications (apps) compatible with our system.

## 2023-12-05 NOTE — ED PROVIDER NOTE - OBJECTIVE STATEMENT
38-year-old male, history of autism, presents this emergency room for 3-day supply of 0.5 mg of p.o. Ativan.  Patient presents with his aide, from a group home.  States that they are having issues with the doctor sending the prescription and it being sent out by the pharmacy.  States that the doctor prescribed it, however pharmacy never sent it.  States that they attempted to call the pharmacy, and states that it would be 3-5 business days.  That is in 2 days.  Requesting a 2-day supply.  Patient is not actively agitated currently.

## 2023-12-05 NOTE — ED PROVIDER NOTE - CLINICAL SUMMARY MEDICAL DECISION MAKING FREE TEXT BOX
Pt presents for Ativan refill  VSS  Explained to a that patient has an active prescription for Ativan, and we cannot give him a 3-day supply  Patient is also calm and cooperative, is not acutely agitated  Patient stable for discharge  All results reviewed with patient and patient's aide.  Understands agrees with plan.  Agreed to follow-up primary care doctor in 2 to 3 days.

## 2023-12-05 NOTE — ED ADULT TRIAGE NOTE - CHIEF COMPLAINT QUOTE
Pt walked in from adult home. Hx autism, minimally verbal. As per accompanying staff MD didn't refill standing order of Ativan 0.5 mg. No acute medical complaints

## 2023-12-05 NOTE — ED ADULT TRIAGE NOTE - AS TEMP SITE
Orders in place for Decadron 8mg IM. Patient states name, date of birth and allergies reviewed.  Decadron 4mg/ml x2 vials = 8mg/2ml administered IM to right deltoid muscle. NDC: 6369-9053-09 Lot: [id number] Exp: 11/30/2023. Patient tolerated without complaints. Instructed to remain in clinic for 15 minutes post injection. Verbalized understanding. No immediate reaction noted.      oral

## 2023-12-06 DIAGNOSIS — Z76.0 ENCOUNTER FOR ISSUE OF REPEAT PRESCRIPTION: ICD-10-CM

## 2023-12-06 DIAGNOSIS — F84.0 AUTISTIC DISORDER: ICD-10-CM

## 2024-01-06 ENCOUNTER — EMERGENCY (EMERGENCY)
Facility: HOSPITAL | Age: 39
LOS: 1 days | Discharge: HOME | End: 2024-01-06
Attending: EMERGENCY MEDICINE | Admitting: EMERGENCY MEDICINE
Payer: MEDICAID

## 2024-01-06 VITALS
RESPIRATION RATE: 19 BRPM | TEMPERATURE: 98 F | DIASTOLIC BLOOD PRESSURE: 82 MMHG | SYSTOLIC BLOOD PRESSURE: 123 MMHG | HEART RATE: 72 BPM | OXYGEN SATURATION: 96 %

## 2024-01-06 PROCEDURE — 99283 EMERGENCY DEPT VISIT LOW MDM: CPT

## 2024-01-06 RX ADMIN — Medication 0.5 MILLIGRAM(S): at 11:06

## 2024-01-06 NOTE — ED PROVIDER NOTE - PHYSICAL EXAMINATION
General: Patient is alert in NAD  Head: NC/AT;  Eyes: EOMI, no lid lag, no proptosis  Ears: Normal  Nose: Normal  Neck: Normal ROM  Lungs: Normal respiratory effort  Heart: Normal rate, no peripheral edema  Neuro: Nonfocal  Skin: No rash, lesions or ulcers  Psych: Normal affect and behavior, intact judgement and insight

## 2024-01-06 NOTE — ED PROVIDER NOTE - CLINICAL SUMMARY MEDICAL DECISION MAKING FREE TEXT BOX
Patient is a 38-year-old male who lives in a group home and the group home has run out of lorazepam tablets.  He is administered 0.5 mg lorazepam twice daily.  Last dose was yesterday.  The group home employee did bring the medication administration record for confirmation.  They present for a refill of the lorazepam and to get first dose now.  Will give one dose now of lorazepam and emergency 4 day prescription.

## 2024-01-06 NOTE — ED PROVIDER NOTE - NSFOLLOWUPINSTRUCTIONS_ED_ALL_ED_FT
Thank you for letting us take care of you today.  Return to the Emergency Department if your symptoms worsen, or if any problems.    You were provided an emergency 4 day prescription--sent to [facility].    Please make sure his regular prescribing physician does order his usual Lorazepam for his group home medications.    He should follow up with his primary care physician next week.

## 2024-01-06 NOTE — ED PROVIDER NOTE - PATIENT PORTAL LINK FT
You can access the FollowMyHealth Patient Portal offered by [facility] by registering at the following website: [email or web address]. By joining [employer]’s FollowMyHealth portal, you will also be able to view your health information using other applications (apps) compatible with our system. You can access the FollowMyHealth Patient Portal offered by [facility] by registering at the following website: [email or web address]. By joining [employer]’s FollowMyHealth portal, you will also be able to view your health information using other applications (apps) compatible with our system.

## 2024-01-06 NOTE — ED PROVIDER NOTE - OBJECTIVE STATEMENT
Patient is a 38-year-old male who lives in a group home and the group home has run out of lorazepam tablets.  He is administered 0.5 mg lorazepam twice daily.  Last dose was yesterday.  The group home employee did bring the medication administration record for confirmation.  They present for a refill of the lorazepam and to get first dose now.    PMH: Moderate mental retardation, seizure disorder, autism, high cholesterol, Lyme disease  Medications: Aspirin, fluoxetine, gemfibrozil, lisinopril, alprazolam, polyethylene glycol, Vimpat

## 2024-01-08 DIAGNOSIS — F84.0 AUTISTIC DISORDER: ICD-10-CM

## 2024-01-08 DIAGNOSIS — Z76.0 ENCOUNTER FOR ISSUE OF REPEAT PRESCRIPTION: ICD-10-CM

## 2024-01-08 DIAGNOSIS — G40.909 EPILEPSY, UNSPECIFIED, NOT INTRACTABLE, WITHOUT STATUS EPILEPTICUS: ICD-10-CM

## 2024-01-08 DIAGNOSIS — F71 MODERATE INTELLECTUAL DISABILITIES: ICD-10-CM

## 2024-01-08 DIAGNOSIS — E78.00 PURE HYPERCHOLESTEROLEMIA, UNSPECIFIED: ICD-10-CM

## 2024-06-08 ENCOUNTER — EMERGENCY (EMERGENCY)
Facility: HOSPITAL | Age: 39
LOS: 1 days | Discharge: HOME | End: 2024-06-08
Admitting: EMERGENCY MEDICINE
Payer: MEDICAID

## 2024-06-08 VITALS
OXYGEN SATURATION: 96 % | TEMPERATURE: 98 F | SYSTOLIC BLOOD PRESSURE: 129 MMHG | RESPIRATION RATE: 16 BRPM | DIASTOLIC BLOOD PRESSURE: 83 MMHG | HEART RATE: 73 BPM

## 2024-06-08 VITALS
SYSTOLIC BLOOD PRESSURE: 130 MMHG | TEMPERATURE: 98 F | HEART RATE: 65 BPM | RESPIRATION RATE: 18 BRPM | OXYGEN SATURATION: 99 % | DIASTOLIC BLOOD PRESSURE: 89 MMHG

## 2024-06-08 DIAGNOSIS — Z76.0 ENCOUNTER FOR ISSUE OF REPEAT PRESCRIPTION: ICD-10-CM

## 2024-06-08 DIAGNOSIS — R56.9 UNSPECIFIED CONVULSIONS: ICD-10-CM

## 2024-06-08 DIAGNOSIS — F84.0 AUTISTIC DISORDER: ICD-10-CM

## 2024-06-08 DIAGNOSIS — F79 UNSPECIFIED INTELLECTUAL DISABILITIES: ICD-10-CM

## 2024-06-08 PROBLEM — A69.20 LYME DISEASE, UNSPECIFIED: Chronic | Status: ACTIVE | Noted: 2024-01-06

## 2024-06-08 PROBLEM — F41.9 ANXIETY DISORDER, UNSPECIFIED: Chronic | Status: ACTIVE | Noted: 2022-08-26

## 2024-06-08 PROBLEM — G40.909 EPILEPSY, UNSPECIFIED, NOT INTRACTABLE, WITHOUT STATUS EPILEPTICUS: Chronic | Status: ACTIVE | Noted: 2024-01-06

## 2024-06-08 PROCEDURE — 99284 EMERGENCY DEPT VISIT MOD MDM: CPT

## 2024-06-08 NOTE — ED PROVIDER NOTE - NSFOLLOWUPINSTRUCTIONS_ED_ALL_ED_FT
The patient has already received his evening dose of Ativan in the ED tonight.  If the medications get delivered tonight do not give him an additional dose as he is already taken 2 doses of his Ativan today which is how it is prescribed.  Call the clinic on Monday morning to get the medication refilled as we do not generally refill ongoing controlled substance prescriptions in the ER.    PLEASE FOLLOW-UP WITH YOUR PRIMARY CARE DOCTOR IN 1-2 DAYS FOR FURTHER EVALUATION.      PLEASE TAKE ALL PAPERWORK FROM TODAY'S VISIT TO YOUR PRIMARY DOCTOR.     IF YOU DO NOT HAVE A PRIMARY CARE DOCTOR PLEASE REFER TO THE OFFICE/CLINIC INFORMATION GIVEN BELOW:    If you do not have a doctor, you can call our referral line to find a doctor that matches your insurance; the number is [phone].     You can also follow up with clinics listed below, if you do not have a doctor:  [place] [address]  To make an appointment, call [phone]    [facility]  Address: [address] [place] [address]  Appointment Center: [phone] ([phone])     PLEASE RETURN TO THE ER IMMEDIATELY OR CALL [phone] ANY HIGH FEVER, CHEST PAIN, TROUBLE BREATHING, VOMITING, SEVERE PAIN, OR ANY OTHER CONCERNS.

## 2024-06-08 NOTE — ED PROVIDER NOTE - PHYSICAL EXAMINATION
Constitutional: Well appearing, awake, alert, and in no apparent distress.  HEENT: Airway patent, NCAT  Resp: no conversational dyspnea, tachypnea, or signs of respiratory distress  Msk: ambulating with normal steady gait  Neuro: moving all extremities.

## 2024-06-08 NOTE — ED ADULT TRIAGE NOTE - CHIEF COMPLAINT QUOTE
pt in adult home d/t intellectual disability (nonverbal), per accompanying staff pt needs med refill for Lorazepam

## 2024-06-08 NOTE — ED PROVIDER NOTE - PATIENT PORTAL LINK FT
You can access the FollowMyHealth Patient Portal offered by [facility] by registering at the following website: [email or web address]. By joining [employer]’s FollowMyHealth portal, you will also be able to view your health information using other applications (apps) compatible with our system.

## 2024-06-08 NOTE — ED ADULT NURSE NOTE - OBJECTIVE STATEMENT
Pt presents to ED with caretaker for medication refill. Pt has PMH of autism spectrum disorder and is nonverbal at this time. Requesting prescription refill for lorazepam, as patient's primary care provider did not send prescription and they are unable to book another appointment until Monday. Last dose was this morning and pt is due for next dose tonight. No medical complaints at this time.

## 2024-06-08 NOTE — ED PROVIDER NOTE - OBJECTIVE STATEMENT
39-year-old male with history of seizures, autism, mental retardation presents today for medication refill.  Patient's clinic is not open on the weekend for his psychiatrist so he is unable to get his prescription from them.  He has a copy of his prescription which shows that his medication ran out this morning.  He took the last dose of 0.5 mg of Ativan this morning.  The prescription is for 0.5 mg 1 tablet twice daily.  Patient is not exhibiting any kind of self-harm behavior on my observation or according to the home health aide.  Case was discussed with the director of the group [facility] at [phone] who confirms that the problem is with the clinic being unreachable, but that the pharmacy should be able to deliver the medications possibly by tonight but definitely by tomorrow morning. 39-year-old male with history of seizures, autism, Nonverbal,mental retardation presents today for medication refill.  Patient's clinic is not open on the weekend for his psychiatrist so he is unable to get his prescription from them.  He has a copy of his prescription which shows that his medication ran out this morning.  He took the last dose of 0.5 mg of Ativan this morning.  The prescription is for 0.5 mg 1 tablet twice daily.  Patient is not exhibiting any kind of self-harm behavior on my observation or according to the home health aide  Who reports that the patient is in his usual state of health..  Case was discussed with the director of the group [facility] at [phone] who confirms that the problem is with the clinic being unreachable, but that the pharmacy should be able to deliver the medications possibly by tonight but definitely by tomorrow morning.

## 2024-06-08 NOTE — ED PROVIDER NOTE - NSICDXPASTMEDICALHX_GEN_ALL_CORE_FT
PAST MEDICAL HISTORY:  Anxiety     Autism     Autism     Autism     HLD (hyperlipidemia)     Lyme disease     Lyme disease     Mental retardation     Mentally challenged     MR (mental retardation), severe     Seizure disorder     Seizure disorder     Seizure disorder

## 2024-06-08 NOTE — ED PROVIDER NOTE - CLINICAL SUMMARY MEDICAL DECISION MAKING FREE TEXT BOX
Patient presents today accompanied by an aide from the group home requesting a medication refill of the patient's Ativan, 0.5 mg, 1 tab twice daily.  Last dose was this morning.  Patient psychiatry clinic is unreachable on the weekend.  They are requesting a refill sent to [facility] who should deliver medications by tonight and if not by tonight in the morning.   Case was discussed with the patient's , Ginny whose phone number is in the HPI.  Prescription for 2 days of medication sent.  In case medications or not delivered tonight, a home dose of the patient's evening medication was given in the ER.

## 2024-07-09 ENCOUNTER — EMERGENCY (EMERGENCY)
Facility: HOSPITAL | Age: 39
LOS: 1 days | Discharge: HOME | End: 2024-07-09
Attending: EMERGENCY MEDICINE | Admitting: EMERGENCY MEDICINE
Payer: MEDICAID

## 2024-07-09 VITALS
DIASTOLIC BLOOD PRESSURE: 90 MMHG | SYSTOLIC BLOOD PRESSURE: 136 MMHG | RESPIRATION RATE: 16 BRPM | HEART RATE: 76 BPM | TEMPERATURE: 98 F | OXYGEN SATURATION: 97 %

## 2024-07-09 PROCEDURE — 99284 EMERGENCY DEPT VISIT MOD MDM: CPT

## 2024-07-09 RX ORDER — LORAZEPAM 0.5 MG
1 TABLET ORAL
Qty: 16 | Refills: 0
Start: 2024-07-09 | End: 2024-07-16

## 2024-07-12 DIAGNOSIS — F79 UNSPECIFIED INTELLECTUAL DISABILITIES: ICD-10-CM

## 2024-07-12 DIAGNOSIS — G40.909 EPILEPSY, UNSPECIFIED, NOT INTRACTABLE, WITHOUT STATUS EPILEPTICUS: ICD-10-CM

## 2024-07-12 DIAGNOSIS — Z76.0 ENCOUNTER FOR ISSUE OF REPEAT PRESCRIPTION: ICD-10-CM

## 2024-09-30 NOTE — ED ADULT NURSE NOTE - NSFALLRSKASSESSTYPE_ED_ALL_ED
comments: eats balanced meals just not diabetic diet per patient    Abnormal BMI (obese):  Body mass index is 37.11 kg/m². (!) Abnormal  Interventions:  Patient declines any further evaluation or treatment           Safety:  Do you have any tripping hazards - loose or unsecured carpets or rugs?: (!) Yes  Do you have non-slip mats or non-slip surfaces or shower bars or grab bars in your shower or bathtub?: (!) No  Interventions:  Home safety tips discussed     Advanced Directives:  Do you have a Living Will?: (!) No    Intervention:  has NO advanced directive - information provided         Lung Cancer Screening:  LDCT Screening: Discussed with patient the benefits and harms of screening, follow-up diagnostic testing, over-diagnosis, false positive rate, and total radiation exposure. Counseled on the importance of adherence to annual lung cancer LDCT screening, impact of comorbidities, ability and willingness to undergo diagnosis and treatment. Counseled on the importance of maintaining cigarette smoking abstinence and cessation. The patient has a history of >20 pack years and is either still smoking or quit within the last 15 years. There are no signs or symptoms of lung cancer.                  Objective   Vitals:    09/30/24 0750   BP: 136/74   Pulse: 72   Resp: 12   Temp: 97 °F (36.1 °C)   SpO2: 98%   Weight: 105.9 kg (233 lb 6.4 oz)   Height: 1.689 m (5' 6.5\")      Body mass index is 37.11 kg/m².      General Appearance: alert and oriented to person, place and time, well developed and well- nourished, in no acute distress  Skin: warm and dry, no rash or erythema  Head: normocephalic and atraumatic  Eyes: pupils equal, round, and reactive to light, extraocular eye movements intact, conjunctivae normal  ENT: tympanic membrane, external ear and ear canal normal bilaterally, nose without deformity, nasal mucosa and turbinates normal without polyps  Neck: supple and non-tender without mass, no thyromegaly or thyroid 
Initial (On Arrival)

## 2024-10-15 NOTE — ED PROVIDER NOTE - CCCP TRG CHIEF CMPLNT
Please follow instructions as recommended during visit.  Recommend low salt DASH diet  Strongly encourage compliance with your medications.   Please reach out to us if you have any questions   Recommend you present to the emergency room or call our office if you have chest pain, chest pressure, shortness of breath, any new, persistent or progressive symptoms.    
medication refill

## 2025-01-28 NOTE — ED PROVIDER NOTE - NS_EDPROVIDERDISPOUSERTYPE_ED_A_ED
I have personally evaluated and examined the patient. The Attending was available to me as a supervising provider if needed.

## 2025-04-10 ENCOUNTER — EMERGENCY (EMERGENCY)
Facility: HOSPITAL | Age: 40
LOS: 1 days | End: 2025-04-10
Attending: EMERGENCY MEDICINE | Admitting: EMERGENCY MEDICINE
Payer: MEDICAID

## 2025-04-10 VITALS
OXYGEN SATURATION: 99 % | SYSTOLIC BLOOD PRESSURE: 136 MMHG | HEART RATE: 71 BPM | HEIGHT: 65 IN | TEMPERATURE: 98 F | DIASTOLIC BLOOD PRESSURE: 93 MMHG | RESPIRATION RATE: 18 BRPM

## 2025-04-10 PROCEDURE — 99284 EMERGENCY DEPT VISIT MOD MDM: CPT

## 2025-04-10 NOTE — ED PROVIDER NOTE - PATIENT PORTAL LINK FT
You can access the FollowMyHealth Patient Portal offered by [facility] by registering at the following website: [email or web address]. By joining [employer]’s FollowMyHealth portal, you will also be able to view your health information using other applications (apps) compatible with our system.

## 2025-04-10 NOTE — ED PROVIDER NOTE - PHYSICAL EXAMINATION
Constitutional: awake and alert, in no acute distress  HEENT: head normocephalic and atraumatic. moist mucous membranes  Eyes: extraocular movements intact, normal conjunctiva  Neck: supple, normal ROM  Cardiovascular: regular rate   Pulmonary: no respiratory distress  Gastrointestinal: abdomen flat and nondistended  Skin: warm, dry, normal for ethnicity  Musculoskeletal: no edema, no deformity  Neurological: moving all extremities, follows basic instructions  Psychiatric: calm and cooperative

## 2025-04-10 NOTE — ED PROVIDER NOTE - CLINICAL SUMMARY MEDICAL DECISION MAKING FREE TEXT BOX
Patient with autism and hypertension here for EKG prior to dental procedure.  EKG unremarkable.  Will give cardiology referral should patient require further testing for preoperative clearance.

## 2025-04-10 NOTE — ED PROVIDER NOTE - CARE PROVIDER_API CALL
Jerrell He  Cardiovascular Disease  [address] [place] [address], [address]  [place] [address]  Phone: [phone]  Fax: [phone]  Follow Up Time:

## 2025-04-10 NOTE — ED PROVIDER NOTE - OBJECTIVE STATEMENT
40-year-old male with history of hypertension and autism brought in by caretaker for EKG.  He is having a dental procedure done and needs an EKG to be done prior to dental procedure.  Patient has no symptoms at this time.  No chest pain or shortness of breath.

## 2025-04-14 DIAGNOSIS — Z01.818 ENCOUNTER FOR OTHER PREPROCEDURAL EXAMINATION: ICD-10-CM

## 2025-04-14 DIAGNOSIS — I10 ESSENTIAL (PRIMARY) HYPERTENSION: ICD-10-CM

## 2025-04-14 DIAGNOSIS — F84.0 AUTISTIC DISORDER: ICD-10-CM

## 2025-04-27 NOTE — ED PROCEDURE NOTE - NS_EDPROVIDERDISPOUSERTYPE_ED_A_ED
2.33
2.29
2.29
I have personally evaluated and examined the patient. The Attending was available to me as a supervising provider if needed.
2.29

## 2025-06-16 ENCOUNTER — EMERGENCY (EMERGENCY)
Facility: HOSPITAL | Age: 40
LOS: 1 days | End: 2025-06-16
Attending: EMERGENCY MEDICINE | Admitting: EMERGENCY MEDICINE
Payer: MEDICAID

## 2025-06-16 VITALS
WEIGHT: 179.9 LBS | DIASTOLIC BLOOD PRESSURE: 89 MMHG | TEMPERATURE: 98 F | SYSTOLIC BLOOD PRESSURE: 140 MMHG | RESPIRATION RATE: 18 BRPM | HEART RATE: 64 BPM | OXYGEN SATURATION: 99 %

## 2025-06-16 LAB
FLUAV AG NPH QL: SIGNIFICANT CHANGE UP
FLUBV AG NPH QL: SIGNIFICANT CHANGE UP
RSV RNA NPH QL NAA+NON-PROBE: SIGNIFICANT CHANGE UP
SARS-COV-2 RNA SPEC QL NAA+PROBE: SIGNIFICANT CHANGE UP
SOURCE RESPIRATORY: SIGNIFICANT CHANGE UP

## 2025-06-16 PROCEDURE — 99283 EMERGENCY DEPT VISIT LOW MDM: CPT

## 2025-06-16 NOTE — ED ADULT NURSE NOTE - CCCP TRG CHIEF CMPLNT
Patient informed of below.  She verbalized her understanding.  Had no further questions or concerns at this time.     Patient will call back to schedule nonfasting labs.   cold symptoms

## 2025-06-16 NOTE — ED ADULT TRIAGE NOTE - TEMPERATURE IN CELSIUS (DEGREES C)
Lopez Reyna is a 78 year old male presenting for consultation Lung Nodules. Discuss test results.    Denies known Latex allergy or symptoms of Latex sensitivity.    Medications verified, no changes.    Social History     Tobacco Use   Smoking Status Former    Current packs/day: 0.00    Types: Cigarettes    Quit date: 1975    Years since quittin.7   Smokeless Tobacco Never     Health Maintenance Due   Topic Date Due    Shingles Vaccine (2 of 3) 2013    DTaP/Tdap/Td Vaccine (2 - Td or Tdap) 2023    COVID-19 Vaccine ( - - season) 2024    Diabetes Eye Exam  2024       Patient would like communication of their results via:      Cell Phone:   Telephone Information:   Mobile [phone]     Okay to leave a message containing results? Yes    
36.6

## 2025-06-16 NOTE — ED PROVIDER NOTE - PROGRESS NOTE DETAILS
Flu/COVID/RSV is negative.  Symptoms are likely viral URI.  Advised expectant management.  Return precautions discussed

## 2025-06-16 NOTE — ED ADULT NURSE NOTE - OBJECTIVE STATEMENT
Pt presents to ED with complaints of cough and congestion. Pt aide at bedside. Pt program requesting nasal swab to rule out RSV/COVID. Pt denies additional requests or complaints. RVP sent to lab for analysis. Patient safety maintained. Will continue to monitor.

## 2025-06-16 NOTE — ED PROVIDER NOTE - OBJECTIVE STATEMENT
40-year-old male with a past medical history of autism, mental retardation, hyperlipidemia presents for flulike symptoms.  Patient presents with group home person.  States needs flu/COVID/RSV swab.

## 2025-06-16 NOTE — ED PROVIDER NOTE - NSFOLLOWUPINSTRUCTIONS_ED_ALL_ED_FT
Upper Respiratory Infection    WHAT YOU NEED TO KNOW:    An upper respiratory infection is also called a cold. It can affect your nose, throat, ears, and sinuses. Cold symptoms are usually worst for the first 3 to 5 days. Most people get better in 7 to 14 days. You may continue to cough for 2 to 3 weeks. Colds are caused by viruses and do not get better with antibiotics.    DISCHARGE INSTRUCTIONS:    Call your local emergency number (911 in the [place]) if:    You have chest pain or trouble breathing.    Return to the emergency department if:    You have a fever over 102ºF (39ºC).    Call your doctor if:    You have a low fever.    Your sore throat gets worse or you see white or yellow spots in your throat.    Your symptoms get worse after 3 to 5 days or are not better in 14 days.    You have a rash anywhere on your skin.    You have large, tender lumps in your neck.    You have thick, green, or yellow drainage from your nose.    You cough up thick yellow, green, or bloody mucus.    You have a bad earache.    You have questions or concerns about your condition or care.  Medicines: You may need any of the following:    Decongestants help reduce nasal congestion and help you breathe more easily. If you take decongestant pills, they may make you feel restless or cause problems with your sleep. Do not use decongestant sprays for more than a few days.    Cough suppressants help reduce coughing. Ask your healthcare provider which type of cough medicine is best for you.    NSAIDs, such as ibuprofen, help decrease swelling, pain, and fever. NSAIDs can cause stomach bleeding or kidney problems in certain people. If you take blood thinner medicine, always ask your healthcare provider if NSAIDs are safe for you. Always read the medicine label and follow directions.    Acetaminophen decreases pain and fever. It is available without a doctor's order. Ask how much to take and how often to take it. Follow directions. Read the labels of all other medicines you are using to see if they also contain acetaminophen, or ask your doctor or pharmacist. Acetaminophen can cause liver damage if not taken correctly.    Take your medicine as directed. Contact your healthcare provider if you think your medicine is not helping or if you have side effects. Tell your provider if you are allergic to any medicine. Keep a list of the medicines, vitamins, and herbs you take. Include the amounts, and when and why you take them. Bring the list or the pill bottles to follow-up visits. Carry your medicine list with you in case of an emergency.  Self-care:    Rest as much as possible. Slowly start to do more each day.    Drink more liquids as directed. Liquids will help thin and loosen mucus so you can cough it up. Liquids will also help prevent dehydration. Liquids that help prevent dehydration include water, fruit juice, and broth. Do not drink liquids that contain caffeine. Caffeine can increase your risk for dehydration. Ask your healthcare provider how much liquid to drink each day.    Soothe a sore throat. Gargle with warm salt water. Make salt water by dissolving ¼ teaspoon salt in 1 cup warm water. You may also suck on hard candy or throat lozenges. You may use a sore throat spray.    Use a humidifier or vaporizer. Use a cool mist humidifier or a vaporizer to increase air moisture in your home. This may make it easier for you to breathe and help decrease your cough.  Humidifier      Use saline nasal drops as directed. These help relieve congestion.    Apply petroleum-based jelly around the outside of your nostrils. This can decrease irritation from blowing your nose.    Do not smoke. Nicotine and other chemicals in cigarettes and cigars can make your symptoms worse. They can also cause infections such as bronchitis or pneumonia. Ask your healthcare provider for information if you currently smoke and need help to quit. E-cigarettes or smokeless tobacco still contain nicotine. Talk to your healthcare provider before you use these products.  Prevent a cold:    Wash your hands often. Use soap and water every time you wash your hands. Rub your soapy hands together, lacing your fingers. Use the fingers of one hand to scrub under the nails of the other hand. Wash for at least 20 seconds. Rinse with warm, running water for several seconds. Then dry your hands. Use hand  gel if soap and water are not available. Do not touch your eyes or mouth without washing your hands first.  Handwashing      Cover a sneeze or cough. Use a tissue that covers your mouth and nose. Put the used tissue in the trash right away. Use the bend of your arm if a tissue is not available. Wash your hands well with soap and water or use a hand . Do not stand close to anyone who is sneezing or coughing.    Try to stay away from others while you are sick. This is especially important during the first 2 to 3 days when the virus is more easily spread. Wait until a fever, cough, or other symptoms are gone before you return to work or other regular activities.    Do not share items while you are sick. This includes food, drinks, eating utensils, and dishes.  Follow up with your doctor as directed: Write down your questions so you remember to ask them during your visits.

## 2025-06-16 NOTE — ED PROVIDER NOTE - PATIENT PORTAL LINK FT
You can access the FollowMyHealth Patient Portal offered by [facility] by registering at the following website: [email or web address]. By joining [email or web address]’s FollowMyHealth portal, you will also be able to view your health information using other applications (apps) compatible with our system.

## 2025-06-18 DIAGNOSIS — J00 ACUTE NASOPHARYNGITIS [COMMON COLD]: ICD-10-CM

## 2025-06-18 DIAGNOSIS — J06.9 ACUTE UPPER RESPIRATORY INFECTION, UNSPECIFIED: ICD-10-CM

## 2025-06-18 DIAGNOSIS — F79 UNSPECIFIED INTELLECTUAL DISABILITIES: ICD-10-CM

## 2025-06-18 DIAGNOSIS — B97.89 OTHER VIRAL AGENTS AS THE CAUSE OF DISEASES CLASSIFIED ELSEWHERE: ICD-10-CM

## 2025-06-18 DIAGNOSIS — F84.0 AUTISTIC DISORDER: ICD-10-CM

## 2025-06-18 DIAGNOSIS — E78.5 HYPERLIPIDEMIA, UNSPECIFIED: ICD-10-CM
